# Patient Record
(demographics unavailable — no encounter records)

---

## 2025-03-16 NOTE — HISTORY OF PRESENT ILLNESS
[de-identified] : CC: nasal congestion   HISTORY OF PRESENT ILLNESS: ROSELINE GARCIA is a 36 year female who presents today with nasal congestion  previously seen by Dr. Valle in 2/2024. she has known allergic rhinitis using Dymista in the past. 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 s/p SRP in 2007   REVIEW OF SYSTEMS: General ROS: negative for - chills, fatigue or fever Psychological ROS: negative for - anxiety or depression Ophthalmic ROS: negative for - blurry vision, decreased vision or double vision ENT ROS: negative except as noted from HPI Allergy and Immunology ROS: negative except as noted from HPI Hematological and Lymphatic ROS: negative for - bleeding problems Endocrine ROS: negative for - malaise/lethargy Respiratory ROS: negative for - stridor Cardiovascular ROS: negative for - chest pain Gastrointestinal ROS: negative for - appetite loss or nausea/vomiting Genitourinary ROS: negative for - incontinence Musculoskeletal ROS: negative for - gait disturbance Neurological ROS: negative for - behavioral changes Dermatological ROS: negative for - nail changes   Physical Exam:   GENERAL APPEARANCE: Well-developed and No Acute Distress. COMMUNICATION: Able to Communicate. Strong Voice.   HEAD AND FACE Eyes: Testing of ocular motility including primary gaze alignment normal. Inspection and Appearance: No evidence of lesions or masses Palpation: Palpation of the face reveals no sinus tenderness Salivary Glands: Symmetric without masses Facial Strength: Symmetric without evidence of facial paralysis   EAR, NOSE, MOUTH, and THROAT: Ear Canals and Tympanic Membranes, Bilateral: No evidence of inflammation or lesions. Thresholds: Clinical speech reception thresholds normal. External, Nose and Auricle: No lesions or masses. Nasal, Mucosa, Septum, and Turbinates: See endoscopy.   NECK: Evaluation: No evidence of masses or crepitus. The neck is symmetric and the trachea is in the midline position. Thyroid: No evidence of enlargement, tenderness or mass. Neck Lymph Nodes: WNL. Respiratory: Inspection of the chest including symmetry, expansion and/or assessment of respiratory effort normal. Cardiovascular: Evaluation of peripheral vascular system by observation and palpation of capillary refill, normal. Neurological/Psychiatric: Alert, Oriented, Mood, and Affect Normal.   PROCEDURE: Nasal endoscopy (46408)   SURGEON: Duane Hernandez MD   Prior to the procedure, I had a discussion with the patient regarding the risks, benefits, and alternatives of the procedure and a verbal consent was obtained.   After obtaining adequate decongestion of the nasal mucosa with topical Epinephrine and adequate anesthesia with topical Lidocaine nasal spray, the nasal endoscope was used to examine the nasal passages and paranasal sinuses. The endoscope was passed along the floor of the nose bilaterally to evaluate the inferior meatus, floor of the nose, inferior turbinate, and nasopharynx. The scope was then passed superiorly to evaluate the area of the sphenoethmoidal recess, superior turbinate and superior meatus. As the scope was withdrawn anteriorly, the middle turbinate and middle meatus were carefully inspected. The endoscope was withdrawn and the patient tolerated the procedure well. No complications were encountered.   INSTRUMENTS: rigid 30   EXAM FINDINGS: moderate BITH, cobblestoning of the inferior turbinates. no polyps or mucopurulence  IMPRESSION: Ms. Garcia is a pleasant 36 year old lady with DNS, allergic rhinitis, s/p SRP in 2007   PLAN: -Dymista or steroid/antihistamine nasal sprays -saline sprays or irrigations if tolerated -RTC PRN   Duane Hernandez MD [facility] Rhinology and Skull Base Surgery Department of Otolaryngology- Head and Neck Surgery [facility]